# Patient Record
(demographics unavailable — no encounter records)

---

## 2025-07-29 NOTE — BIRTH HISTORY
1.  Date/reason for appt: 10/9/17- Dermatitis     2.  Referring provider: Self     3.  Call to patient (Yes / No - short description): No - faxed cover sheet to PCP office in attempt for records. Referral entered is Dr. Joie Thompson.     4.  Previous care at / records requested from:     1. [facility] - Faxed cover sheet.    [Non-Contributory] : Non-contributory

## 2025-07-30 NOTE — PHYSICAL EXAM
[FreeTextEntry1] : General: Patient is awake and alert and in no acute distress.  Well developed, well nourished, cooperative, able to get on and off the bed with ease.		 Skin: The skin is intact, warm, pink, and dry over the area examined.  Eyes: normal tinted sclera, normal eyelids and pupils were equal and round.  ENT: normal ears, normal nose and normal lips. Cardiovascular: There is brisk capillary refill in the digits of the affected extremity. They are symmetric pulses in the bilateral upper and lower extremities, positive peripheral pulses, brisk capillary refill, but no peripheral edema. Respiratory: The patient is in no apparent respiratory distress. They're taking full deep breaths without use of accessory muscles or evidence of audible wheezes or stridor without the use of a stethoscope, normal respiratory effort.  Neurological: 5/5 motor strength in the main muscle groups of bilateral lower extremities, sensory intact in bilateral lower extremities.  Musculoskeletal:  Examination of both the upper and lower extremities did not show any obvious abnormality. There is no gross deformity. Patient has full range of motion of both the hips, knees, ankles, wrists, elbows, and shoulders. Neck range of motion is full and free without any pain or spasm.   Examination of the back reveals very mild shoulder asymmetry. The pelvis is symmetric.  Postural round back that is minimally corrective with passive hyperextension.  Patient is able to bend forward and touch the toes as well bend backwards without pain. Lateral flexion is symmetrical and is pain free. Straight leg raising test is free to more than 70 degrees.   Neurological examination reveals a grade 5/5 muscle power. Sensation is intact to crude touch and pinprick. Deep tendon reflexes are 1+ with ankle jerk and knee jerk. The plantars are bilaterally down going. Superficial abdominal reflexes are symmetric and intact. The biceps and triceps reflexes are 1+.    There is no hairy patch, lipoma, sinus in the back. There is no pes cavus, asymmetry of calves, significant leg length discrepancy or significant cafe-au-lait spots

## 2025-07-30 NOTE — DATA REVIEWED
[de-identified] : My interpretation and review of images taken today, 07/29/2025, in office: Scoliosis AP/lat xrays were ordered, obtained, and independently reviewed today in clinic which are remarkable for <10 degree thoraco lumbar curvature. There is 40 degree of kyphosis, improved from previous images. There no vertebral abnormalities that were noticed. Risser IV.   My interpretation and review of images taken  07/05/2023, in office:  Scoliosis AP/lat xrays were ordered, obtained, and independently reviewed today in clinic which are remarkable for 10 degree thoracic curvature. There is 44 degree of kyphosis, improved from previous images. There no vertebral abnormalities that were noticed. Risser 3.   Scoliosis x-rays AP and lateral were done 06/08/2021 IN brace:  There is 11 degree thoracic curve on AP x-ray. There is 47 degree of kyphosis (improvement since last visit). There no vertebral abnormalities that were noticed.Risser zero

## 2025-07-30 NOTE — REASON FOR VISIT
[Follow Up] : a follow up visit [Patient] : patient [Mother] : mother [FreeTextEntry1] : scoliosis and kyphosis

## 2025-07-30 NOTE — DATA REVIEWED
[de-identified] : My interpretation and review of images taken today, 07/29/2025, in office: Scoliosis AP/lat xrays were ordered, obtained, and independently reviewed today in clinic which are remarkable for <10 degree thoraco lumbar curvature. There is 40 degree of kyphosis, improved from previous images. There no vertebral abnormalities that were noticed. Risser IV.   My interpretation and review of images taken  07/05/2023, in office:  Scoliosis AP/lat xrays were ordered, obtained, and independently reviewed today in clinic which are remarkable for 10 degree thoracic curvature. There is 44 degree of kyphosis, improved from previous images. There no vertebral abnormalities that were noticed. Risser 3.   Scoliosis x-rays AP and lateral were done 06/08/2021 IN brace:  There is 11 degree thoracic curve on AP x-ray. There is 47 degree of kyphosis (improvement since last visit). There no vertebral abnormalities that were noticed.Risser zero

## 2025-07-30 NOTE — HISTORY OF PRESENT ILLNESS
[FreeTextEntry1] : Alexandra is a 14 year old female who presents with her older mother for follow up of scoliosis and kyphosis. In11/2022, imaging had suggested a 68 degree of kyphosis and 14 degree Scoliosis. She was last see for this issues in July 2023.  She was recommended to continue with her TLSO brace.  She was lost to follow up due to changes in her insurance. She had been wearing the TLSO brace until about 1 year ago when she outgrew it. She is otherwise in her usual state of health and denies any current back pain, radiating pain, numbness or weakness. She has no bowel or bladder dysfunction. She is able to participate in all of her normal activities. She has not noticed any changes in the appearance of her back or shoulders. They do notice an asymmetry in her chest wall/ribs. No family history of scoliosis however her older sister has kyphosis which is treated in a brace. She is here for further orthopedic evaluation and management. Menarche December 2021.

## 2025-07-30 NOTE — ASSESSMENT
[FreeTextEntry1] : Alexandra is a 14 year old female with improved 40 degree kyphosis and <10 degree spinal asymmetry. Risser IV  Today's visit included obtaining history from the parent due to the child's age, the child could not be considered a reliable historian, requiring parent to act as independent historian. Clinical findings and imaging discussed at length with patient and mother.  Based on the XRs performed today there is improvement. Today, we discussed family's concern with her chest wall asymmetry. Referral to our colleagues in General Surgery was provided today. they are also interested in restarting a soft postural brace. Rx provided. A prescription for PT was also provided for the child to help with her posture and core strength. This plan was discussed with family and all questions and concerns were addressed today.  FU 6 months AP/lateral scoliosis XR  I, Melody Doyle PA-C, have acted as a scribe and documented the above for Dr. Reddy  The above documentation completed by the scribe is an accurate record of both my words and actions.

## 2025-07-30 NOTE — REVIEW OF SYSTEMS
[Nl] : Musculoskeletal [No Acute Changes] : No acute changes since previous visit [Change in Activity] : no change in activity [Fever Above 102] : no fever [Nosebleeds] : no epistaxis [Shortness of Breath] : no shortness of breath [Vomiting] : no vomiting [Limping] : no limping [Back Pain] : ~T no back pain